# Patient Record
Sex: MALE | Race: WHITE | NOT HISPANIC OR LATINO | URBAN - METROPOLITAN AREA
[De-identification: names, ages, dates, MRNs, and addresses within clinical notes are randomized per-mention and may not be internally consistent; named-entity substitution may affect disease eponyms.]

---

## 2017-06-05 ENCOUNTER — APPOINTMENT (OUTPATIENT)
Dept: RADIOLOGY | Facility: MEDICAL CENTER | Age: 52
End: 2017-06-05
Attending: EMERGENCY MEDICINE
Payer: COMMERCIAL

## 2017-06-05 ENCOUNTER — HOSPITAL ENCOUNTER (OUTPATIENT)
Facility: MEDICAL CENTER | Age: 52
End: 2017-06-06
Attending: EMERGENCY MEDICINE | Admitting: HOSPITALIST
Payer: COMMERCIAL

## 2017-06-05 DIAGNOSIS — R51.9 ACUTE NONINTRACTABLE HEADACHE, UNSPECIFIED HEADACHE TYPE: ICD-10-CM

## 2017-06-05 DIAGNOSIS — R29.90 STROKE-LIKE SYMPTOMS: ICD-10-CM

## 2017-06-05 LAB
ALBUMIN SERPL BCP-MCNC: 3.7 G/DL (ref 3.2–4.9)
ALBUMIN/GLOB SERPL: 1.5 G/DL
ALP SERPL-CCNC: 52 U/L (ref 30–99)
ALT SERPL-CCNC: 21 U/L (ref 2–50)
ANION GAP SERPL CALC-SCNC: 7 MMOL/L (ref 0–11.9)
APPEARANCE UR: CLEAR
APTT PPP: 28.3 SEC (ref 24.7–36)
AST SERPL-CCNC: 21 U/L (ref 12–45)
BASOPHILS # BLD AUTO: 0.3 % (ref 0–1.8)
BASOPHILS # BLD: 0.02 K/UL (ref 0–0.12)
BILIRUB SERPL-MCNC: 0.5 MG/DL (ref 0.1–1.5)
BILIRUB UR QL STRIP.AUTO: NEGATIVE
BUN SERPL-MCNC: 14 MG/DL (ref 8–22)
CALCIUM SERPL-MCNC: 8.7 MG/DL (ref 8.5–10.5)
CHLORIDE SERPL-SCNC: 107 MMOL/L (ref 96–112)
CO2 SERPL-SCNC: 28 MMOL/L (ref 20–33)
COLOR UR: YELLOW
CREAT SERPL-MCNC: 1.02 MG/DL (ref 0.5–1.4)
EOSINOPHIL # BLD AUTO: 0.04 K/UL (ref 0–0.51)
EOSINOPHIL NFR BLD: 0.7 % (ref 0–6.9)
ERYTHROCYTE [DISTWIDTH] IN BLOOD BY AUTOMATED COUNT: 38.5 FL (ref 35.9–50)
GFR SERPL CREATININE-BSD FRML MDRD: >60 ML/MIN/1.73 M 2
GLOBULIN SER CALC-MCNC: 2.5 G/DL (ref 1.9–3.5)
GLUCOSE SERPL-MCNC: 121 MG/DL (ref 65–99)
GLUCOSE UR STRIP.AUTO-MCNC: NEGATIVE MG/DL
HCT VFR BLD AUTO: 37.5 % (ref 42–52)
HGB BLD-MCNC: 13.1 G/DL (ref 14–18)
IMM GRANULOCYTES # BLD AUTO: 0.01 K/UL (ref 0–0.11)
IMM GRANULOCYTES NFR BLD AUTO: 0.2 % (ref 0–0.9)
INR PPP: 0.97 (ref 0.87–1.13)
KETONES UR STRIP.AUTO-MCNC: ABNORMAL MG/DL
LEUKOCYTE ESTERASE UR QL STRIP.AUTO: NEGATIVE
LYMPHOCYTES # BLD AUTO: 0.89 K/UL (ref 1–4.8)
LYMPHOCYTES NFR BLD: 14.7 % (ref 22–41)
MCH RBC QN AUTO: 29.6 PG (ref 27–33)
MCHC RBC AUTO-ENTMCNC: 34.9 G/DL (ref 33.7–35.3)
MCV RBC AUTO: 84.8 FL (ref 81.4–97.8)
MICRO URNS: ABNORMAL
MONOCYTES # BLD AUTO: 0.19 K/UL (ref 0–0.85)
MONOCYTES NFR BLD AUTO: 3.1 % (ref 0–13.4)
NEUTROPHILS # BLD AUTO: 4.9 K/UL (ref 1.82–7.42)
NEUTROPHILS NFR BLD: 81 % (ref 44–72)
NITRITE UR QL STRIP.AUTO: NEGATIVE
NRBC # BLD AUTO: 0 K/UL
NRBC BLD AUTO-RTO: 0 /100 WBC
PH UR STRIP.AUTO: 7 [PH]
PLATELET # BLD AUTO: 153 K/UL (ref 164–446)
PMV BLD AUTO: 9.4 FL (ref 9–12.9)
POTASSIUM SERPL-SCNC: 3.7 MMOL/L (ref 3.6–5.5)
PROT SERPL-MCNC: 6.2 G/DL (ref 6–8.2)
PROT UR QL STRIP: NEGATIVE MG/DL
PROTHROMBIN TIME: 13.2 SEC (ref 12–14.6)
RBC # BLD AUTO: 4.42 M/UL (ref 4.7–6.1)
RBC UR QL AUTO: NEGATIVE
SODIUM SERPL-SCNC: 142 MMOL/L (ref 135–145)
SP GR UR STRIP.AUTO: >1.035
TROPONIN I SERPL-MCNC: <0.01 NG/ML (ref 0–0.04)
WBC # BLD AUTO: 6.1 K/UL (ref 4.8–10.8)

## 2017-06-05 PROCEDURE — 80053 COMPREHEN METABOLIC PANEL: CPT

## 2017-06-05 PROCEDURE — 84484 ASSAY OF TROPONIN QUANT: CPT

## 2017-06-05 PROCEDURE — 700117 HCHG RX CONTRAST REV CODE 255: Performed by: EMERGENCY MEDICINE

## 2017-06-05 PROCEDURE — 96375 TX/PRO/DX INJ NEW DRUG ADDON: CPT

## 2017-06-05 PROCEDURE — 71010 DX-CHEST-LIMITED (1 VIEW): CPT

## 2017-06-05 PROCEDURE — 85610 PROTHROMBIN TIME: CPT

## 2017-06-05 PROCEDURE — 85730 THROMBOPLASTIN TIME PARTIAL: CPT

## 2017-06-05 PROCEDURE — 81003 URINALYSIS AUTO W/O SCOPE: CPT

## 2017-06-05 PROCEDURE — 70498 CT ANGIOGRAPHY NECK: CPT

## 2017-06-05 PROCEDURE — 96374 THER/PROPH/DIAG INJ IV PUSH: CPT

## 2017-06-05 PROCEDURE — 99285 EMERGENCY DEPT VISIT HI MDM: CPT

## 2017-06-05 PROCEDURE — 85025 COMPLETE CBC W/AUTO DIFF WBC: CPT

## 2017-06-05 PROCEDURE — 83036 HEMOGLOBIN GLYCOSYLATED A1C: CPT

## 2017-06-05 PROCEDURE — 700111 HCHG RX REV CODE 636 W/ 250 OVERRIDE (IP): Performed by: EMERGENCY MEDICINE

## 2017-06-05 PROCEDURE — 70496 CT ANGIOGRAPHY HEAD: CPT

## 2017-06-05 PROCEDURE — 70450 CT HEAD/BRAIN W/O DYE: CPT

## 2017-06-05 PROCEDURE — 700105 HCHG RX REV CODE 258: Performed by: EMERGENCY MEDICINE

## 2017-06-05 PROCEDURE — 96361 HYDRATE IV INFUSION ADD-ON: CPT

## 2017-06-05 RX ORDER — SODIUM CHLORIDE 9 MG/ML
1000 INJECTION, SOLUTION INTRAVENOUS ONCE
Status: COMPLETED | OUTPATIENT
Start: 2017-06-05 | End: 2017-06-05

## 2017-06-05 RX ORDER — METOCLOPRAMIDE HYDROCHLORIDE 5 MG/ML
10 INJECTION INTRAMUSCULAR; INTRAVENOUS ONCE
Status: COMPLETED | OUTPATIENT
Start: 2017-06-05 | End: 2017-06-05

## 2017-06-05 RX ORDER — KETOROLAC TROMETHAMINE 30 MG/ML
30 INJECTION, SOLUTION INTRAMUSCULAR; INTRAVENOUS ONCE
Status: COMPLETED | OUTPATIENT
Start: 2017-06-05 | End: 2017-06-05

## 2017-06-05 RX ORDER — ONDANSETRON 2 MG/ML
4 INJECTION INTRAMUSCULAR; INTRAVENOUS ONCE
Status: COMPLETED | OUTPATIENT
Start: 2017-06-05 | End: 2017-06-05

## 2017-06-05 RX ORDER — LORATADINE 10 MG/1
10 TABLET ORAL DAILY
COMMUNITY

## 2017-06-05 RX ADMIN — SODIUM CHLORIDE 1000 ML: 9 INJECTION, SOLUTION INTRAVENOUS at 22:29

## 2017-06-05 RX ADMIN — ONDANSETRON 4 MG: 2 INJECTION INTRAMUSCULAR; INTRAVENOUS at 22:31

## 2017-06-05 RX ADMIN — METOCLOPRAMIDE 10 MG: 5 INJECTION, SOLUTION INTRAMUSCULAR; INTRAVENOUS at 22:34

## 2017-06-05 RX ADMIN — KETOROLAC TROMETHAMINE 30 MG: 30 INJECTION, SOLUTION INTRAMUSCULAR at 22:30

## 2017-06-05 RX ADMIN — IOHEXOL 100 ML: 350 INJECTION, SOLUTION INTRAVENOUS at 22:03

## 2017-06-05 ASSESSMENT — ENCOUNTER SYMPTOMS
HEADACHES: 1
SPEECH CHANGE: 1
WEAKNESS: 1
VOMITING: 1
NAUSEA: 1

## 2017-06-05 NOTE — IP AVS SNAPSHOT
" Home Care Instructions                                                                                                                  Name:Manish Singh  Medical Record Number:[id number]  CSN: [id number]    YOB: 1965   Age: 52 y.o.  Sex: male  HT:1.854 m (6' 1\") WT: 87.8 kg (193 lb 9 oz)          Admit Date: 6/5/2017     Discharge Date:   Today's Date: 6/6/2017  Attending Doctor:  Kane Paulino M.D.                  Allergies:  Review of patient's allergies indicates no known allergies.            Discharge Instructions       Discharge Instructions    Discharged to home by car with relative. Discharged via wheelchair, hospital escort: Yes.  Special equipment needed: Not Applicable    Be sure to schedule a follow-up appointment with your primary care doctor or any specialists as instructed.     Discharge Plan:   Diet Plan: Discussed (Regular)  Activity Level: Discussed (As tolerated)  Confirmed Follow up Appointment: Patient to Call and Schedule Appointment  Confirmed Symptoms Management: Discussed  Medication Reconciliation Updated: Yes  Influenza Vaccine Indication: Not indicated: Previously immunized this influenza season and > 8 years of age    I understand that a diet low in cholesterol, fat, and sodium is recommended for good health. Unless I have been given specific instructions below for another diet, I accept this instruction as my diet prescription.   Other diet: Regular    Special Instructions: None    · Is patient discharged on Warfarin / Coumadin?   No     · Is patient Post Blood Transfusion?  No    Depression / Suicide Risk    As you are discharged from this [facility] facility, it is important to learn how to keep safe from harming yourself.    Recognize the warning signs:  · Abrupt changes in personality, positive or negative- including increase in energy   · Giving away possessions  · Change in eating patterns- significant weight changes-  positive or negative  · Change in " sleeping patterns- unable to sleep or sleeping all the time   · Unwillingness or inability to communicate  · Depression  · Unusual sadness, discouragement and loneliness  · Talk of wanting to die  · Neglect of personal appearance   · Rebelliousness- reckless behavior  · Withdrawal from people/activities they love  · Confusion- inability to concentrate     If you or a loved one observes any of these behaviors or has concerns about self-harm, here's what you can do:  · Talk about it- your feelings and reasons for harming yourself  · Remove any means that you might use to hurt yourself (examples: pills, rope, extension cords, firearm)  · Get professional help from the community (Mental Health, Substance Abuse, psychological counseling)  · Do not be alone:Call your Safe Contact- someone whom you trust who will be there for you.  · Call your local CRISIS HOTLINE 014-2428 or 824-346-5394  · Call your local Children's Mobile Crisis Response Team [place] (035) 131-9797 or www.WellDoc  · Call the toll free National Suicide Prevention Hotlines   · National Suicide Prevention Lifeline 299-311-RPQI (1656)  · National Hope Line Network 800-SUICIDE (965-9180)        Follow-up Information     1. Follow up with López Lawson M.D. On 6/16/2017.    Specialty:  Internal Medicine    Why:  PLEASE ARRIVE AT 4:00PM FOR A 4:15PM APPOINTMENT. THANK YOU    Contact information    [address] [facility] [address]  [phone]           Discharge Medication Instructions:    Below are the medications your physician expects you to take upon discharge:    Review all your home medications and newly ordered medications with your doctor and/or pharmacist. Follow medication instructions as directed by your doctor and/or pharmacist.    Please keep your medication list with you and share with your physician.               Medication List      CONTINUE taking these medications        Instructions    Morning Afternoon Evening  Bedtime    CELEBREX PO        Take 1 Cap by mouth 2 Times a Day.   Dose:  1 Cap                        loratadine 10 MG Tabs   Commonly known as:  CLARITIN        Take 10 mg by mouth every day.   Dose:  10 mg                        LOSARTAN POTASSIUM PO   Last time this was given:  25 mg on 6/6/2017  8:47 AM        Take 1 Tab by mouth every day.   Dose:  1 Tab                        LOVASTATIN PO        Take 1 Tab by mouth every evening.   Dose:  1 Tab                        NEXIUM 24HR PO        Take 1 Cap by mouth every day.   Dose:  1 Cap                                Instructions           Diet / Nutrition:    Follow any diet instructions given to you by your doctor or the dietician, including how much salt (sodium) you are allowed each day.    If you are overweight, talk to your doctor about a weight reduction plan.    Activity:    Remain physically active following your doctor's instructions about exercise and activity.    Rest often.     Any time you become even a little tired or short of breath, SIT DOWN and rest.    Worsening Symptoms:    Report any of the following signs and symptoms to the doctor's office immediately:    *Pain of jaw, arm, or neck  *Chest pain not relieved by medication                               *Dizziness or loss of consciousness  *Difficulty breathing even when at rest   *More tired than usual                                       *Bleeding drainage or swelling of surgical site  *Swelling of feet, ankles, legs or stomach                 *Fever (>100ºF)  *Pink or blood tinged sputum  *Weight gain (3lbs/day or 5lbs /week)           *Shock from internal defibrillator (if applicable)  *Palpitations or irregular heartbeats                *Cool and/or numb extremities    Stroke Awareness    Common Risk Factors for Stroke include:    Age  Atrial Fibrillation  Carotid Artery Stenosis  Diabetes Mellitus  Excessive alcohol consumption  High blood pressure  Overweight   Physical  inactivity  Smoking    Warning signs and symptoms of a stroke include:    *Sudden numbness or weakness of the face, arm or leg (especially on one side of the body).  *Sudden confusion, trouble speaking or understanding.  *Sudden trouble seeing in one or both eyes.  *Sudden trouble walking, dizziness, loss of balance or coordination.Sudden severe headache with no known cause.    It is very important to get treatment quickly when a stroke occurs. If you experience any of the above warning signs, call 911 immediately.                   Disclaimer         Quit Smoking / Tobacco Use:    I understand the use of any tobacco products increases my chance of suffering from future heart disease or stroke and could cause other illnesses which may shorten my life. Quitting the use of tobacco products is the single most important thing I can do to improve my health. For further information on smoking / tobacco cessation call a Toll Free Quit Line at 1-379.874.3220 (*National Cancer [place]) or 1-951.138.1820 (American Lung Association) or you can access the web based program at www.lungYahoo!.org.    [place] Tobacco Users Help Line:  (303) 291-4840       Toll Free: 1-914.732.7862  Quit Tobacco Program [facility] ([phone]    Crisis Hotline:    [place] Crisis Hotline:  6-508-HQHLEJB or 1-374.494.1311    [place] Crisis Hotline:    1-601.376.6329 or 563-551-4401    Discharge Survey:   Thank you for choosing [facility]. We hope we did everything we could to make your hospital stay a pleasant one. You may be receiving a phone survey and we would appreciate your time and participation in answering the questions. Your input is very valuable to us in our efforts to improve our service to our patients and their families.        My signature on this form indicates that:    1. I have reviewed and understand the above information.  2. My questions regarding this information have been answered to my  satisfaction.  3. I have formulated a plan with my discharge nurse to obtain my prescribed medications for home.                  Disclaimer         __________________________________                     __________       ________                       Patient Signature                                                 Date                    Time

## 2017-06-06 ENCOUNTER — PATIENT OUTREACH (OUTPATIENT)
Dept: HEALTH INFORMATION MANAGEMENT | Facility: OTHER | Age: 52
End: 2017-06-06

## 2017-06-06 ENCOUNTER — RESOLUTE PROFESSIONAL BILLING HOSPITAL PROF FEE (OUTPATIENT)
Dept: HOSPITALIST | Facility: MEDICAL CENTER | Age: 52
End: 2017-06-06
Payer: COMMERCIAL

## 2017-06-06 ENCOUNTER — APPOINTMENT (OUTPATIENT)
Dept: RADIOLOGY | Facility: MEDICAL CENTER | Age: 52
End: 2017-06-06
Attending: HOSPITALIST
Payer: COMMERCIAL

## 2017-06-06 VITALS
HEIGHT: 73 IN | TEMPERATURE: 97.9 F | RESPIRATION RATE: 16 BRPM | BODY MASS INDEX: 25.65 KG/M2 | SYSTOLIC BLOOD PRESSURE: 114 MMHG | DIASTOLIC BLOOD PRESSURE: 58 MMHG | OXYGEN SATURATION: 96 % | HEART RATE: 71 BPM | WEIGHT: 193.56 LBS

## 2017-06-06 LAB
CHOLEST SERPL-MCNC: 98 MG/DL (ref 100–199)
EST. AVERAGE GLUCOSE BLD GHB EST-MCNC: 105 MG/DL
HBA1C MFR BLD: 5.3 % (ref 0–5.6)
HDLC SERPL-MCNC: 36 MG/DL
LDLC SERPL CALC-MCNC: 48 MG/DL
LV EJECT FRACT  99904: 65
LV EJECT FRACT MOD 2C 99903: 64.38
LV EJECT FRACT MOD 4C 99902: 70.95
LV EJECT FRACT MOD BP 99901: 67.95
TRIGL SERPL-MCNC: 68 MG/DL (ref 0–149)

## 2017-06-06 PROCEDURE — 700111 HCHG RX REV CODE 636 W/ 250 OVERRIDE (IP): Performed by: EMERGENCY MEDICINE

## 2017-06-06 PROCEDURE — 80061 LIPID PANEL: CPT

## 2017-06-06 PROCEDURE — 36415 COLL VENOUS BLD VENIPUNCTURE: CPT

## 2017-06-06 PROCEDURE — A9270 NON-COVERED ITEM OR SERVICE: HCPCS | Performed by: HOSPITALIST

## 2017-06-06 PROCEDURE — G0378 HOSPITAL OBSERVATION PER HR: HCPCS

## 2017-06-06 PROCEDURE — 700102 HCHG RX REV CODE 250 W/ 637 OVERRIDE(OP): Performed by: HOSPITALIST

## 2017-06-06 PROCEDURE — 93306 TTE W/DOPPLER COMPLETE: CPT | Mod: 26 | Performed by: INTERNAL MEDICINE

## 2017-06-06 PROCEDURE — 99217 PR OBSERVATION CARE DISCHARGE: CPT | Performed by: HOSPITALIST

## 2017-06-06 PROCEDURE — 93306 TTE W/DOPPLER COMPLETE: CPT

## 2017-06-06 PROCEDURE — 96375 TX/PRO/DX INJ NEW DRUG ADDON: CPT

## 2017-06-06 PROCEDURE — 70551 MRI BRAIN STEM W/O DYE: CPT

## 2017-06-06 RX ORDER — LOVASTATIN 20 MG/1
20 TABLET ORAL EVERY EVENING
Status: DISCONTINUED | OUTPATIENT
Start: 2017-06-06 | End: 2017-06-06 | Stop reason: HOSPADM

## 2017-06-06 RX ORDER — OXYCODONE HYDROCHLORIDE 5 MG/1
5 TABLET ORAL
Status: DISCONTINUED | OUTPATIENT
Start: 2017-06-06 | End: 2017-06-06 | Stop reason: HOSPADM

## 2017-06-06 RX ORDER — MORPHINE SULFATE 4 MG/ML
2 INJECTION, SOLUTION INTRAMUSCULAR; INTRAVENOUS
Status: DISCONTINUED | OUTPATIENT
Start: 2017-06-06 | End: 2017-06-06 | Stop reason: HOSPADM

## 2017-06-06 RX ORDER — BUTALBITAL, ACETAMINOPHEN AND CAFFEINE 50; 325; 40 MG/1; MG/1; MG/1
1 TABLET ORAL EVERY 6 HOURS PRN
Status: DISCONTINUED | OUTPATIENT
Start: 2017-06-06 | End: 2017-06-06 | Stop reason: HOSPADM

## 2017-06-06 RX ORDER — POLYETHYLENE GLYCOL 3350 17 G/17G
1 POWDER, FOR SOLUTION ORAL
Status: DISCONTINUED | OUTPATIENT
Start: 2017-06-06 | End: 2017-06-06 | Stop reason: HOSPADM

## 2017-06-06 RX ORDER — OXYCODONE HYDROCHLORIDE 5 MG/1
2.5 TABLET ORAL
Status: DISCONTINUED | OUTPATIENT
Start: 2017-06-06 | End: 2017-06-06 | Stop reason: HOSPADM

## 2017-06-06 RX ORDER — ACETAMINOPHEN 325 MG/1
650 TABLET ORAL EVERY 6 HOURS PRN
Status: DISCONTINUED | OUTPATIENT
Start: 2017-06-06 | End: 2017-06-06 | Stop reason: HOSPADM

## 2017-06-06 RX ORDER — AMOXICILLIN 250 MG
2 CAPSULE ORAL 2 TIMES DAILY
Status: DISCONTINUED | OUTPATIENT
Start: 2017-06-06 | End: 2017-06-06 | Stop reason: HOSPADM

## 2017-06-06 RX ORDER — MORPHINE SULFATE 4 MG/ML
4 INJECTION, SOLUTION INTRAMUSCULAR; INTRAVENOUS ONCE
Status: COMPLETED | OUTPATIENT
Start: 2017-06-06 | End: 2017-06-06

## 2017-06-06 RX ORDER — ONDANSETRON 4 MG/1
4 TABLET, ORALLY DISINTEGRATING ORAL EVERY 4 HOURS PRN
Status: DISCONTINUED | OUTPATIENT
Start: 2017-06-06 | End: 2017-06-06 | Stop reason: HOSPADM

## 2017-06-06 RX ORDER — ONDANSETRON 2 MG/ML
4 INJECTION INTRAMUSCULAR; INTRAVENOUS EVERY 4 HOURS PRN
Status: DISCONTINUED | OUTPATIENT
Start: 2017-06-06 | End: 2017-06-06 | Stop reason: HOSPADM

## 2017-06-06 RX ORDER — LOSARTAN POTASSIUM 50 MG/1
25 TABLET ORAL DAILY
Status: DISCONTINUED | OUTPATIENT
Start: 2017-06-06 | End: 2017-06-06 | Stop reason: HOSPADM

## 2017-06-06 RX ORDER — LORAZEPAM 2 MG/ML
0.5 INJECTION INTRAMUSCULAR EVERY 6 HOURS PRN
Status: DISCONTINUED | OUTPATIENT
Start: 2017-06-06 | End: 2017-06-06 | Stop reason: HOSPADM

## 2017-06-06 RX ORDER — PROMETHAZINE HYDROCHLORIDE 25 MG/1
12.5-25 TABLET ORAL EVERY 4 HOURS PRN
Status: DISCONTINUED | OUTPATIENT
Start: 2017-06-06 | End: 2017-06-06 | Stop reason: HOSPADM

## 2017-06-06 RX ORDER — PROMETHAZINE HYDROCHLORIDE 25 MG/1
12.5-25 SUPPOSITORY RECTAL EVERY 4 HOURS PRN
Status: DISCONTINUED | OUTPATIENT
Start: 2017-06-06 | End: 2017-06-06 | Stop reason: HOSPADM

## 2017-06-06 RX ORDER — BISACODYL 10 MG
10 SUPPOSITORY, RECTAL RECTAL
Status: DISCONTINUED | OUTPATIENT
Start: 2017-06-06 | End: 2017-06-06 | Stop reason: HOSPADM

## 2017-06-06 RX ORDER — LORAZEPAM 1 MG/1
0.5 TABLET ORAL EVERY 6 HOURS PRN
Status: DISCONTINUED | OUTPATIENT
Start: 2017-06-06 | End: 2017-06-06 | Stop reason: HOSPADM

## 2017-06-06 RX ADMIN — ASPIRIN 81 MG: 81 TABLET ORAL at 11:44

## 2017-06-06 RX ADMIN — LOSARTAN POTASSIUM 25 MG: 50 TABLET, FILM COATED ORAL at 08:47

## 2017-06-06 RX ADMIN — MORPHINE SULFATE 4 MG: 4 INJECTION INTRAVENOUS at 00:41

## 2017-06-06 RX ADMIN — DOCUSATE SODIUM AND SENNOSIDES 2 TABLET: 8.6; 5 TABLET, FILM COATED ORAL at 08:47

## 2017-06-06 ASSESSMENT — LIFESTYLE VARIABLES
EVER HAD A DRINK FIRST THING IN THE MORNING TO STEADY YOUR NERVES TO GET RID OF A HANGOVER: NO
CONSUMPTION TOTAL: NEGATIVE
HOW MANY TIMES IN THE PAST YEAR HAVE YOU HAD 5 OR MORE DRINKS IN A DAY: 0
EVER_SMOKED: NEVER
TOTAL SCORE: 0
HAVE YOU EVER FELT YOU SHOULD CUT DOWN ON YOUR DRINKING: NO
EVER FELT BAD OR GUILTY ABOUT YOUR DRINKING: NO
ON A TYPICAL DAY WHEN YOU DRINK ALCOHOL HOW MANY DRINKS DO YOU HAVE: 7
HAVE PEOPLE ANNOYED YOU BY CRITICIZING YOUR DRINKING: NO
TOTAL SCORE: 0
ALCOHOL_USE: YES
TOTAL SCORE: 0
AVERAGE NUMBER OF DAYS PER WEEK YOU HAVE A DRINK CONTAINING ALCOHOL: 1

## 2017-06-06 ASSESSMENT — COPD QUESTIONNAIRES
COPD SCREENING SCORE: 1
DURING THE PAST 4 WEEKS HOW MUCH DID YOU FEEL SHORT OF BREATH: NONE/LITTLE OF THE TIME
HAVE YOU SMOKED AT LEAST 100 CIGARETTES IN YOUR ENTIRE LIFE: NO/DON'T KNOW
DO YOU EVER COUGH UP ANY MUCUS OR PHLEGM?: NO/ONLY WITH OCCASIONAL COLDS OR INFECTIONS

## 2017-06-06 NOTE — ED NOTES
[facility] starting at [facility]. Patient c/o headache, left sided facial droop, and left facial paresthesias. Symptoms have resolved at this time.

## 2017-06-06 NOTE — PROGRESS NOTES
Pt discharged to home. IV d/c'd, pt armband removed. Pt and family understand written and verbal d/c instructions.  No new rx's.  Regular diet, activity as tolerated.  Pt to follow up with PCP (appt scheduled).  Pt verbalized understanding.

## 2017-06-06 NOTE — CONSULTS
Teleneurology Consultation Note        Patient Information  Patient name:      Manish Singh  MRN:         [id number]  :         1965  Date of Service: 2017    Facility:     Reason for Consult: Dysarthria    Gender: male    Age: 52 y.o.      Patient History    Last known well: 7:15PM    History of Present Illness: 52 year old male with a history significant for hypertension, hyperlipidemia, GERD, and migraines who presents with dysarthria and facial tingling. Patient's wife is at the bedside at the time of my evaluation. The telemedicine equipment malfunctioned. I was able to clearly see and hear the patient and wife. They however could not see or hear me. I ended up having to communicate with the telephone through the ED physician.  Patient was in his normal state of good health until 7:15 when he developed a severe headache. Around 8 :15 he was noted to have slurred speech and also complained of left facial numbness. The majority of these symptoms have improved.  He also has nausea and photophobia.    Allergies:  No Known Allergies    Hospital Medications:    Current facility-administered medications:   •  NS infusion 1,000 mL, 1,000 mL, Intravenous, Once, Carlos Craig M.D.  •  ketorolac (TORADOL) injection 30 mg, 30 mg, Intravenous, Once, Carlos Craig M.D.  •  ondansetron (ZOFRAN) syringe/vial injection 4 mg, 4 mg, Intravenous, Once, Carlos Craig M.D.  •  metoclopramide (REGLAN) injection 10 mg, 10 mg, Intravenous, Once, Carlos Craig M.D.  No current outpatient prescriptions on file.    Current Outpatient Medications:    (Not in a hospital admission)    Physical Exam:  See NIHSS  NIH Stroke Scale:    1a. Level of Consciousness (Alert, drowsy, etc): 0    1b. LOC Questions (Month, age): 0    1c. LOC Commands (Open/close eyes make fist/let go): 0    2.   Best Gaze (Eyes open - patient follows examiner's finger on face): 0    3.   Visual Fields (introduce visual  stimulus/threat to patient's field quadrants): 0  4.   Facial Paresis (Show teeth, raise eyebrows and squeeze eyes shut): 0     5a. Motor Arm - Left (Elevate arm to 90 degrees if patient is sitting, 45 degrees if  supine): 0    5b. Motor Arm - Right (Elevate arm to 90 degrees if patient is sitting, 45 degrees if supine): 0    6a. Motor Leg - Left (Elevate leg 30 degrees with patient supine): 0    6b. Motor Leg - Right  (Elevate leg 30 degrees with patient supine): 0    7.   Limb Ataxia (Finger-nose, heel down shin): 0    8.   Sensory (Pin prick to face, arm, trunk and leg - compare side to side): 0    9.  Best Language (Name item, describe a picture and read sentences): 0    10. Dysarthria (Evaluate speech clarity by patient repeating listed words): 0    11. Extinction and Inattention (Use information from prior testing to identify neglect or  double simultaneous stimuli testing): 0    Total NIH Score: 0    Imaging:     Laboratory:   Recent Labs      06/05/17   2140   WBC  6.1   RBC  4.42*   HEMOGLOBIN  13.1*   HEMATOCRIT  37.5*   MCV  84.8   MCH  29.6   MCHC  34.9   RDW  38.5   PLATELETCT  153*   MPV  9.4     Recent Labs      06/05/17   2140   APTT  28.3   INR  0.97       Patient is a TPA candidate: NO    Patient may be an IR candidate: NO    Diagnosis: complex migraine vs TIA  52 year old male who was last known well around 7:15 when he developed a severe headache and subsequently developed dysarthria and facial paraesthesias around 8:15. He also has nausea and photophobia.  He has an NIHSS of 0.  The leading diagnostic criteria for his symptoms is a complex migraine, however given his risk factors TIA/stroke can not be ruled out.  He will be admitted for further work up.    Recommendation:   Admit for stroke/TIA work up   x 1 now  Neuro consult  Neuro checks  IV NS @ 100 cc/ with permissive HTN    Andria Hancock M.D.    Date: __6/5/2017_____  [facility]:    This consultation  was conducted utilizing secure and encrypted videoconferencing equipment with the assistance of a trained tele-presenter at the originating site.

## 2017-06-06 NOTE — ED NOTES
NIH of 2 given to patient by ERP prior to rooming of patient for facial droop and left facial paresthesia. Symptoms resolved at this time.

## 2017-06-06 NOTE — ED NOTES
Med rec is a partial.  Pt/family are not able to recall the strengths of the medications.  Family believes the current names of the medications  Are correct .  Will call home pharmacy when open.

## 2017-06-06 NOTE — DISCHARGE INSTRUCTIONS
Discharge Instructions    Discharged to home by car with relative. Discharged via wheelchair, hospital escort: Yes.  Special equipment needed: Not Applicable    Be sure to schedule a follow-up appointment with your primary care doctor or any specialists as instructed.     Discharge Plan:   Diet Plan: Discussed (Regular)  Activity Level: Discussed (As tolerated)  Confirmed Follow up Appointment: Patient to Call and Schedule Appointment  Confirmed Symptoms Management: Discussed  Medication Reconciliation Updated: Yes  Influenza Vaccine Indication: Not indicated: Previously immunized this influenza season and > 8 years of age    I understand that a diet low in cholesterol, fat, and sodium is recommended for good health. Unless I have been given specific instructions below for another diet, I accept this instruction as my diet prescription.   Other diet: Regular    Special Instructions: None    · Is patient discharged on Warfarin / Coumadin?   No     · Is patient Post Blood Transfusion?  No    Depression / Suicide Risk    As you are discharged from this [facility] facility, it is important to learn how to keep safe from harming yourself.    Recognize the warning signs:  · Abrupt changes in personality, positive or negative- including increase in energy   · Giving away possessions  · Change in eating patterns- significant weight changes-  positive or negative  · Change in sleeping patterns- unable to sleep or sleeping all the time   · Unwillingness or inability to communicate  · Depression  · Unusual sadness, discouragement and loneliness  · Talk of wanting to die  · Neglect of personal appearance   · Rebelliousness- reckless behavior  · Withdrawal from people/activities they love  · Confusion- inability to concentrate     If you or a loved one observes any of these behaviors or has concerns about self-harm, here's what you can do:  · Talk about it- your feelings and reasons for harming yourself  · Remove any means  that you might use to hurt yourself (examples: pills, rope, extension cords, firearm)  · Get professional help from the community (Mental Health, Substance Abuse, psychological counseling)  · Do not be alone:Call your Safe Contact- someone whom you trust who will be there for you.  · Call your local CRISIS HOTLINE 354-4876 or 438-574-7157  · Call your local Children's Mobile Crisis Response Team [place] (896) 617-8798 or www.Physiq  · Call the toll free National Suicide Prevention Hotlines   · National Suicide Prevention Lifeline 146-782-YNAW (6088)  · National Hope Line Network 800-SUICIDE (780-9952)

## 2017-06-06 NOTE — PROGRESS NOTES
Report received, assumed patient care.  Pt A&OX4.  Family at bedside.  Assessment completed.  Call light within reach, personal belongings available, bed in lowest position, pt calling for assistance.  Pt reports headache, declines pain meds.  Communication board updated, POC discussed.  VSS.  No additional needs at this time.

## 2017-06-06 NOTE — PROGRESS NOTES
A/o,assessment completed per CDU,poc discussed,verbalized understanding,ambulated to bathroom with steady gait,hooked to the monitors,call button within reach,will continue to monitor.

## 2017-06-06 NOTE — ED PROVIDER NOTES
"ED Provider Note    Scribed for Carlos Craig M.D. by Ghada Marcelino. 6/5/2017, 9:37 PM.    Primary care provider: No primary care provider  Means of arrival: EMS  History obtained from: patient, EMS  History limited by: none    CHIEF COMPLAINT  Chief Complaint   Patient presents with   • Possible Stroke       HPI  Manish Singh is a 52 y.o. male who presents to the Emergency Department for possible stroke onset three hours ago. At that time patient had a sudden onset headache while eating with family. He has a history of migraines, usually once a year. Today feels the same, but worse. Pain is behind his behind eyes. Per family, patient's speech is \"lazy\", but not slurred. There is associated left-sided weakness. He has never had weakness with migraines before. Patient also had some nausea and vomiting. Two episodes of vomiting, with one en route; zofran was given by EMS. He denies any blood thinners. History of GERD, hypertension, hyperlipidemia, and migraines.    REVIEW OF SYSTEMS  Review of Systems   Gastrointestinal: Positive for nausea and vomiting.   Neurological: Positive for speech change, weakness and headaches.        Positive for stroke, facial droop, decreased sensation, weakness   All other systems reviewed and are negative.  C    PAST MEDICAL HISTORY  Migraines    SURGICAL HISTORY  patient denies any surgical history    SOCIAL HISTORY  None    FAMILY HISTORY  None    CURRENT MEDICATIONS  Esomeprazole Magnesium (NEXIUM 24HR PO), 1 Cap, Oral, DAILY, 6/5/2017 at 0730  LOSARTAN POTASSIUM PO, 1 Tab, Oral, DAILY, 6/5/2017 at 0730  LOVASTATIN PO, 1 Tab, Oral, Q EVENING, 6/5/2017 at 1900  Loratadine, 10 mg, Oral, DAILY, 6/5/2017 at 0730  Celecoxib (CELEBREX PO), 1 Cap, Oral, BID, 6/5/2017 at 0730    ALLERGIES  None    PHYSICAL EXAM  VITAL SIGNS: Pulse 73  Resp 19  Ht 1.854 m (6' 1\")  Wt 86.183 kg (190 lb)  BMI 25.07 kg/m2  SpO2 95%    Constitutional: Well developed, Well nourished, mild " distress.   HENT: Normocephalic, Atraumatic, Oropharynx moist.   Eyes: Conjunctiva normal, No discharge.   Cardiovascular: Normal heart rate, Normal rhythm, No murmurs, equal pulses.   Pulmonary: Normal breath sounds, No respiratory distress, No wheezing, No rales, No rhonchi.  Abdomen:Soft, No tenderness, No masses, no rebound, no guarding.   Back: No CVA tenderness.   Musculoskeletal: No major deformities noted, No tenderness.   Skin: Warm, Dry, No erythema, No rash.   Neurologic: Alert & oriented x 3, questionable facial droop on left with decreased sensation, decreased strength to left arm not leg, NIH 2  Psychiatric: Affect normal, Judgment normal, Mood normal.     DIAGNOSTIC STUDIES/PROCEDURES  LABS  Results for orders placed or performed during the hospital encounter of 06/05/17   URINALYSIS   Result Value Ref Range    Color Yellow     Character Clear     Specific Gravity >1.035 (A) <1.035    Ph 7.0 5.0-8.0    Glucose Negative Negative mg/dL    Ketones Trace (A) Negative mg/dL    Protein Negative Negative mg/dL    Bilirubin Negative Negative    Nitrite Negative Negative    Leukocyte Esterase Negative Negative    Occult Blood Negative Negative    Micro Urine Req see below    PROTHROMBIN TIME   Result Value Ref Range    PT 13.2 12.0 - 14.6 sec    INR 0.97 0.87 - 1.13   APTT   Result Value Ref Range    APTT 28.3 24.7 - 36.0 sec   CBC WITH DIFFERENTIAL   Result Value Ref Range    WBC 6.1 4.8 - 10.8 K/uL    RBC 4.42 (L) 4.70 - 6.10 M/uL    Hemoglobin 13.1 (L) 14.0 - 18.0 g/dL    Hematocrit 37.5 (L) 42.0 - 52.0 %    MCV 84.8 81.4 - 97.8 fL    MCH 29.6 27.0 - 33.0 pg    MCHC 34.9 33.7 - 35.3 g/dL    RDW 38.5 35.9 - 50.0 fL    Platelet Count 153 (L) 164 - 446 K/uL    MPV 9.4 9.0 - 12.9 fL    Neutrophils-Polys 81.00 (H) 44.00 - 72.00 %    Lymphocytes 14.70 (L) 22.00 - 41.00 %    Monocytes 3.10 0.00 - 13.40 %    Eosinophils 0.70 0.00 - 6.90 %    Basophils 0.30 0.00 - 1.80 %    Immature Granulocytes 0.20 0.00 - 0.90 %     Nucleated RBC 0.00 /100 WBC    Neutrophils (Absolute) 4.90 1.82 - 7.42 K/uL    Lymphs (Absolute) 0.89 (L) 1.00 - 4.80 K/uL    Monos (Absolute) 0.19 0.00 - 0.85 K/uL    Eos (Absolute) 0.04 0.00 - 0.51 K/uL    Baso (Absolute) 0.02 0.00 - 0.12 K/uL    Immature Granulocytes (abs) 0.01 0.00 - 0.11 K/uL    NRBC (Absolute) 0.00 K/uL   COMP METABOLIC PANEL   Result Value Ref Range    Sodium 142 135 - 145 mmol/L    Potassium 3.7 3.6 - 5.5 mmol/L    Chloride 107 96 - 112 mmol/L    Co2 28 20 - 33 mmol/L    Anion Gap 7.0 0.0 - 11.9    Glucose 121 (H) 65 - 99 mg/dL    Bun 14 8 - 22 mg/dL    Creatinine 1.02 0.50 - 1.40 mg/dL    Calcium 8.7 8.5 - 10.5 mg/dL    AST(SGOT) 21 12 - 45 U/L    ALT(SGPT) 21 2 - 50 U/L    Alkaline Phosphatase 52 30 - 99 U/L    Total Bilirubin 0.5 0.1 - 1.5 mg/dL    Albumin 3.7 3.2 - 4.9 g/dL    Total Protein 6.2 6.0 - 8.2 g/dL    Globulin 2.5 1.9 - 3.5 g/dL    A-G Ratio 1.5 g/dL   TROPONIN   Result Value Ref Range    Troponin I <0.01 0.00 - 0.04 ng/mL   ESTIMATED GFR   Result Value Ref Range    GFR If African American >60 >60 mL/min/1.73 m 2    GFR If Non African American >60 >60 mL/min/1.73 m 2   All labs reviewed by me.    RADIOLOGY  DX-CHEST-LIMITED (1 VIEW)   Final Result      No acute cardiopulmonary disease.      CT-CTA NECK WITH & W/O-POST PROCESSING   Final Result      No focal stenosis, dissection or occlusion of the cervical carotid or vertebral arteries.      CT-CTA HEAD WITH & W/O-POST PROCESS   Final Result      No intracranial aneurysm, focal stenosis or large vessel cut off.      CT-HEAD W/O   Final Result      No acute intracranial abnormality.      MR-BRAIN-W/O    (Results Pending)   Echocardiogram Comp W/O cont    (Results Pending)   The radiologist's interpretation of all radiological studies have been reviewed by me.    COURSE & MEDICAL DECISION MAKING  Pertinent Labs & Imaging studies reviewed. (See chart for details)    9:37 PM - Patient seen and examined at bedside. Ordered chest  XR, head CT, head CTA, neck CTA, cbc with differential, comp metabolic panel, and other labs to evaluate his symptoms.     10:04 PM Paged for neurology.    10:04 PM Patient reevaluated at bedside. Patient resting comfortably and in no acute distress. Discussed resulted studies. Discussed plan of care with family.    10:08 PM Consulted with Dr. Hancock, neurology, about the patient's case.    10:15 PM Dr. Hancock at bedside via computer for exam. He advises admission. Does not feel patient is a alteplase  candidate given sympotms have resolved and low NIH score. Advised stroke work up and MRI.    10:21 PM Discussed admission with family and patient. They are agreeable.    10:29 PM Paged for hospitalist.    12:35 AM Spoke to Dr. Gutiérrez, hospitalist, about the patient's case. They will admit for further care and evaluation.    12:37 AM Patient reevaluated at bedside. Patient resting, but still has headache.    12:38 AM Ordered 4mg morphine.    Decision Making:  This is a 52 y.o. male who presents with at this point in time patient's symptoms of a result is unclear if this is a complex migraine, versus a TIA versus a stroke. Patient will be admitted for further evaluation with MRI to rule out stroke he will also likely need a workup to look for embolic causes.    DISPOSITION:  Patient will be admitted to [facility] in guarded condition.    FINAL IMPRESSION  1. Stroke-like symptoms    2. Acute nonintractable headache, unspecified headache type       Ghada FERNANDEZ (Emmanuel), justin scribing for, and in the presence of, Carlos Craig M.D.    Electronically signed by: Ghada Marcelino (Emmanuel), 6/5/2017    Carlos FERNANDEZ M.D. personally performed the services described in this documentation, as scribed by Ghada Marcelino in my presence, and it is both accurate and complete.    The note accurately reflects work and decisions made by me.  Carlos Craig  6/6/2017  5:17 AM

## 2017-06-06 NOTE — H&P
CHIEF COMPLAINT:  Left facial droop and slurred speech with headache.      PRIMARY CARE PROVIDER:  López Lawson MD      HISTORY OF PRESENT ILLNESS:  This is a 52-year-old man with a history of   migraine headaches who developed a sudden onset of headache today during   dinner.  He stated that it became very severe.  He went to bed and laid down,   but then started having left facial droop and somewhat slow speech, but not   exactly slurred, although there was a little component of slurring to it.  He   did not think that he was weak in his extremities, but definitely left side of   his face was weak and numb.  He has never had this in association with a   migraine before.  He also had some nausea and vomiting associated with it.  He   was given morphine in the emergency department and clinically improving, in   fact, he states that the facial droop and the speech issue has resolved   completely and his headache is nearly gone.  He has not had any dizziness,   palpitations, chest pain, shortness of breath, fever, chills, back pain, neck   pain or stiffness, lower extremity edema, or orthopnea.      REVIEW OF SYSTEMS:  A complete review of systems was performed and other than   what is stated in history present illness is otherwise negative.      PAST MEDICAL HISTORY:  GERD, hypertension, hyperlipidemia, migraine headaches.        PAST SURGICAL HISTORY:  None.      FAMILY HISTORY:  Negative for stroke.      SOCIAL HISTORY:  He has never been a smoker.  No alcohol or illicit drug use.    He is .  His wife and son are at the bedside.      ALLERGIES:  He has no known drug allergies.      CURRENT MEDICATIONS:  Celebrex 1 capsule twice daily, esomeprazole 1 capsule   daily, loratadine 10 mg daily, losartan once daily, lovastatin once daily,   doses are not specified on these medications.      PHYSICAL EXAMINATION:    VITAL SIGNS:  He is afebrile, heart rate is 76, respirations 16, pulse   oximetry is _____ on  room air and blood pressure is 104/54.    GENERAL:  This is a fatigue appearing, but otherwise well-developed man.  He   is awake, alert, oriented x3, pleasant, cooperative with the examination.    HEENT:  Normocephalic, atraumatic.  Pupils are equal and reactive.    Extraocular movements are intact.  Sclerae are not icteric.  Oropharynx is   clear.  Mucous membranes are moist.    NECK:  Supple, without lymphadenopathy.  No jugular venous distension is   present.  Trachea is midline without stridor.    CHEST:  Clear to auscultation bilaterally without rales, rhonchi or wheezes.    CARDIOVASCULAR:  Regular rate.  I do not detect a murmur, rub or gallop.  No   ventricular heave is present.  Radial pulses normal and symmetric.  Capillary   refill is within normal limits.    ABDOMEN:  Soft, not tender, not distended.  Normoactive bowel sounds are   present.    MUSCULOSKELETAL:  No cyanosis, clubbing or edema of the extremities is   present.    SKIN:  Warm and dry, normal color and temperature.  No rashes, ecchymoses or   petechia are present.    NEUROLOGIC:  He is alert, oriented x3.  On my examination, the cranial nerves   are intact, II-XII are examined.  He has no motor weakness, sensory is intact.    He has intact sensory left side of his face.  Deep tendon reflexes are   normal and symmetric.      LABORATORY DATA:  White blood cell count is 6.1, hemoglobin is 13.1,   hematocrit 37.5 with platelets of 153.  Sodium is 142, potassium 3.7, chloride   107, bicarbonate 28, glucose of 121, BUN 14, creatinine 1.02, calcium is 8.7,   AST is 21, ALT is 21, alkaline phosphatase 62, total bilirubin is 0.5,   albumin is 3.7.  Troponin is less than 0.01.  INR is 0.97.  Urinalysis is   unremarkable.  CT angiogram of the neck shows no focal stenosis, no dissection   or occlusion of the carotid arteries.  CT angiogram of the head similarly no   aneurysm, focal stenosis or _____.  Plain CT of the head is unremarkable as   well.   Chest x-ray, no infiltrate, effusion, or edema is seen.      IMPRESSION:  Facial droop associated with numbness and speech difficulty,   appears to be complex migraine; however, with the patient's history of   hyperlipidemia and hypertension, a cerebrovascular accident must be ruled out.        PLAN:  He has been seen by teleneurology in the emergency department and they   are recommending observation admission with stroke workup including MRI and   echocardiogram.  He has already had a CT angiogram of the neck and thus does   not need a carotid duplex study.  Patient is already feeling better in the   emergency department with being given morphine, we can continue this as   needed, but I am also going to offer him Fioricet should he have recurrence of   headache.  He appeared to have completely resolved with his neurological   symptoms.  Neuro checks every 4 hours have been ordered and he will be   admitted to the neurology unit.  I am going to continue his usual medications.        This is an observation patient and I anticipate he will not be here more than   2 midnight stay.       ____________________________________     MD CINTHYA PARRA / ROJAS    DD:  06/06/2017 01:58:58  DT:  06/06/2017 02:21:35    D#:  [id number]  Job#:  [id number]

## 2017-06-07 NOTE — DISCHARGE SUMMARY
CODE STATUS:  Full code.    PRIMARY CARE PROVIDER:  López Lawson MD.    DISCHARGE DIAGNOSES:  1.  Complicated migraine.  2.  Transient ischemic attack.    CHRONIC MEDICAL PROBLEMS:  1.  Hypertension.  2.  Hyperlipidemia.  3.  Migraine headaches.  4.  Gastroesophageal reflux disease.    STUDIES:  Hematology:  WBC 6.1, hemoglobin and hematocrit 13.1 and 37.5,   platelets 153.  Chemistry panel:  Sodium 142, potassium 3.7, otherwise   unremarkable chemistry panel.  Lipid panel:  Total cholesterol 98,   triglycerides 68, HDL 36, LDL 48.  Troponin was negative at 0.01.  PT 13.2,   INR 0.97.  Urinalysis showed a trace of ketones, otherwise unremarkable.    IMAGING STUDIES:  1.  Head CT indicate no acute intracranial abnormality.  2.  CTA of the neck with and without indicates no focal stenosis, dissection   or occlusion of the cervical carotid or vertebral artery.  3.  CTA of the head with and without indicates no intracranial aneurysm, focal   stenosis or large vessel cut off.  4.  Chest x-ray indicates no acute cardiopulmonary disease.  5.  MRI of the brain indicates no acute abnormality, unremarkable noncontrast   MRI examination of the brain.  Mucous retention cyst in the right maxillary   sinus.  6.  Echocardiogram indicates, agitated saline study was performed with and   without Valsalva maneuvers showing no evidence of right to left shunt by   agitated saline challenge.  Normal left ventricular size and systolic   function.  Left ventricular ejection fraction visually estimated to be 65%.    Right heart pressures are consistent with mild pulmonary hypertension.  No   significant valve abnormalities.    CONSULTS:  Tele-neurology consultation with Dr. Hancock, please see his   consult note for further details.    PROCEDURES:  None.    HOSPITAL COURSE:  H and P on admission done by Dr. Crystal Gutiérrez.  Please see   her full H and P for further details.  Briefly, this is a 52-year-old   gentleman who presented to the  emergency department with left facial droop and   slurred speech with a headache.  Patient reports that he does have a history   of migraine headaches; however, he noticed a left facial droop and slurred   speech and then began vomiting and became concerned as this was not a typical   migraine.  He presented to the emergency department for further evaluation and   was admitted to the clinical decision unit in stable condition.  Patient   actually reports that a lot of his symptoms had resolved by the time he got to   the hospital, and his headache improved with pain medication.  The patient   did have a full stroke workup including an MRI and an echocardiogram.  He also   had a CTA of the head and neck and no acute findings in all of these exams.    Patient's symptoms did resolve.  His headache also resolved, but it had   resolved prior to arrival.  We discussed that he may have had a TIA or a   complex migraine and discussed treatments moving forward.    On day of discharge, patient is sitting up in bed, eating and drinking without   any difficulty.  His vital signs have remained stable.  His blood work has   been unremarkable and he has been cleared for discharge.  Patient does state   that his headache has completely resolved.  He no longer has any facial droop   or slurred speech.  He is alert and oriented x4.  He is ambulating with a   smooth steady gait and states clear understanding of all instructions given.    He has been advised to follow up with his primary care provider.  He is to   make a followup appointment to discuss recent hospitalization.  The patient   does have an appointment on the 16th of this month, which is 10 days from now   but he has been advised to call and double check to see if they would like to   see him any sooner.  The patient and wife states clear understanding of   instructions given.    DISPOSITION:  To home.    CONDITION:  He is stable.    ACTIVITY:  As tolerated.    DIET:   Continue a healthy cardiac diet.    MEDICATIONS:  1.  Celebrex p.o. take 1 by mouth 2 times a day.  2.  Claritin 10 mg tablets, take 1 by mouth every day.  3.  Losartan potassium p.o. take 1 by mouth every day.  4.  Lovastatin p.o. take 1 by mouth every evening.  5.  Nexium 24-hour p.o. take 1 by mouth every day.    FOLLOWUP:  López Lawson, go to scheduled appointment on 06/16/2017 at 4:00   p.m.    INSTRUCTIONS:  Advised to return to the emergency department with any   worsening or concerning symptoms and also strongly advised to follow up as   discussed with his primary care provider.    Time spent on discharge, 38 minutes.       ____________________________________     BRODY Culver / ROJAS    DD:  06/06/2017 13:48:05  DT:  06/06/2017 22:13:21    D#:  [id number]  Job#:  [id number]    cc: LÓPEZ LAWSON MD

## 2021-06-30 NOTE — DISCHARGE PLANNING
Care Transition Team Assessment    Information Source  Orientation : Oriented x 4  Information Given By: Patient  Informant's Name:  (Manish)  Who is responsible for making decisions for patient? : Patient    Readmission Evaluation  Is this a readmission?: No    Elopement Risk  Legal Hold: No    Interdisciplinary Discharge Planning  Does Admitting Nurse Feel This Could be a Complex Discharge?: No  Primary Care Physician:  (López Lawson MD-[place])  Lives with - Patient's Self Care Capacity: Spouse, Child Less than 18 Years of Age  Support Systems: Children, Family Member(s)  Housing / Facility: [address]  Do You Take your Prescribed Medications Regularly: Yes ([facility])  Able to Return to Previous ADL's: Yes  Mobility Issues: No  Prior Services: None  Patient Expects to be Discharged to::  (Home)  Assistance Needed: No    Discharge Preparedness  What are your discharge supports?: Spouse  Prior Functional Level: Ambulatory, Drives Self, Independent with Activities of Daily Living    Functional Assesment  Prior Functional Level: Ambulatory, Drives Self, Independent with Activities of Daily Living    Finances  Financial Barriers to Discharge: No  Prescription Coverage: Yes (copays)    Vision / Hearing Impairment  Vision Impairment :  (reading glasses)  Hearing Impairment : No    Values / Beliefs / Concerns  Values / Beliefs Concerns : No    Advance Directive  Advance Directive?: None  Advance Directive offered?: AD Booklet refused    Domestic Abuse  Have you ever been the victim of abuse or violence?: No    Psychological Assessment  History of Substance Abuse: None  History of Psychiatric Problems: No    Discharge Risks or Barriers  Discharge risks or barriers?: No    Anticipated Discharge Information  Anticipated discharge disposition: Home  Discharge Address:  ([address], [place])  Discharge Contact Phone Number:  (Maggie (wife) [phone])         Pt's caregiver called stating pt experiencing symptoms, states had fever overnight, answered no to all other COVID-19 screening questions. I informed pt that I will page an ISABEL to give Vignesh a call @ [phone] and if pt does not get a call back within half an hour to call [phone] again. Pt agreed. ISABEL paged.